# Patient Record
Sex: MALE | Race: WHITE | Employment: FULL TIME | URBAN - METROPOLITAN AREA
[De-identification: names, ages, dates, MRNs, and addresses within clinical notes are randomized per-mention and may not be internally consistent; named-entity substitution may affect disease eponyms.]

---

## 2017-03-12 ENCOUNTER — OFFICE VISIT (OUTPATIENT)
Dept: FAMILY MEDICINE CLINIC | Age: 32
End: 2017-03-12
Payer: COMMERCIAL

## 2017-03-12 VITALS
HEART RATE: 74 BPM | BODY MASS INDEX: 22.9 KG/M2 | DIASTOLIC BLOOD PRESSURE: 69 MMHG | SYSTOLIC BLOOD PRESSURE: 128 MMHG | TEMPERATURE: 97.2 F | WEIGHT: 160 LBS | OXYGEN SATURATION: 98 % | HEIGHT: 70 IN

## 2017-03-12 DIAGNOSIS — J20.9 ACUTE BRONCHITIS, UNSPECIFIED ORGANISM: Primary | ICD-10-CM

## 2017-03-12 PROCEDURE — 99213 OFFICE O/P EST LOW 20 MIN: CPT | Performed by: INTERNAL MEDICINE

## 2017-03-12 RX ORDER — AMOXICILLIN AND CLAVULANATE POTASSIUM 875; 125 MG/1; MG/1
TABLET, FILM COATED ORAL
Refills: 0 | COMMUNITY
Start: 2017-02-28 | End: 2017-12-03 | Stop reason: ALTCHOICE

## 2017-03-12 RX ORDER — AZITHROMYCIN 250 MG/1
TABLET, FILM COATED ORAL
Qty: 1 PACKET | Refills: 0 | Status: SHIPPED | OUTPATIENT
Start: 2017-03-12 | End: 2017-03-22

## 2017-03-12 ASSESSMENT — ENCOUNTER SYMPTOMS
COUGH: 1
SHORTNESS OF BREATH: 0

## 2017-08-20 ENCOUNTER — OFFICE VISIT (OUTPATIENT)
Dept: FAMILY MEDICINE CLINIC | Age: 32
End: 2017-08-20
Payer: COMMERCIAL

## 2017-08-20 VITALS
BODY MASS INDEX: 23.39 KG/M2 | DIASTOLIC BLOOD PRESSURE: 79 MMHG | OXYGEN SATURATION: 99 % | HEART RATE: 68 BPM | TEMPERATURE: 98.2 F | WEIGHT: 163 LBS | RESPIRATION RATE: 17 BRPM | SYSTOLIC BLOOD PRESSURE: 132 MMHG

## 2017-08-20 DIAGNOSIS — J02.9 ACUTE PHARYNGITIS, UNSPECIFIED ETIOLOGY: Primary | ICD-10-CM

## 2017-08-20 PROCEDURE — 99213 OFFICE O/P EST LOW 20 MIN: CPT | Performed by: INTERNAL MEDICINE

## 2017-08-20 RX ORDER — AZITHROMYCIN 250 MG/1
TABLET, FILM COATED ORAL
Qty: 1 PACKET | Refills: 0 | Status: SHIPPED | OUTPATIENT
Start: 2017-08-20 | End: 2017-08-30

## 2017-08-20 ASSESSMENT — ENCOUNTER SYMPTOMS
COUGH: 0
SORE THROAT: 1
SHORTNESS OF BREATH: 0

## 2017-12-03 ENCOUNTER — OFFICE VISIT (OUTPATIENT)
Dept: FAMILY MEDICINE CLINIC | Age: 32
End: 2017-12-03
Payer: COMMERCIAL

## 2017-12-03 VITALS
BODY MASS INDEX: 25.06 KG/M2 | TEMPERATURE: 97.8 F | DIASTOLIC BLOOD PRESSURE: 65 MMHG | OXYGEN SATURATION: 98 % | SYSTOLIC BLOOD PRESSURE: 111 MMHG | RESPIRATION RATE: 16 BRPM | HEIGHT: 71 IN | WEIGHT: 179 LBS | HEART RATE: 63 BPM

## 2017-12-03 DIAGNOSIS — J20.9 ACUTE BRONCHITIS, UNSPECIFIED ORGANISM: Primary | ICD-10-CM

## 2017-12-03 PROCEDURE — 99213 OFFICE O/P EST LOW 20 MIN: CPT | Performed by: INTERNAL MEDICINE

## 2017-12-03 RX ORDER — FLUTICASONE PROPIONATE 50 MCG
2 SPRAY, SUSPENSION (ML) NASAL
COMMUNITY
Start: 2017-02-28 | End: 2017-12-03 | Stop reason: ALTCHOICE

## 2017-12-03 RX ORDER — AMOXICILLIN AND CLAVULANATE POTASSIUM 875; 125 MG/1; MG/1
1 TABLET, FILM COATED ORAL
COMMUNITY
Start: 2017-02-28 | End: 2017-12-03 | Stop reason: ALTCHOICE

## 2017-12-03 RX ORDER — ALBUTEROL SULFATE 90 UG/1
AEROSOL, METERED RESPIRATORY (INHALATION)
COMMUNITY
Start: 2013-03-04 | End: 2021-12-18

## 2017-12-03 RX ORDER — AZITHROMYCIN 250 MG/1
TABLET, FILM COATED ORAL
Qty: 1 PACKET | Refills: 0 | Status: SHIPPED | OUTPATIENT
Start: 2017-12-03 | End: 2017-12-13

## 2017-12-03 ASSESSMENT — ENCOUNTER SYMPTOMS
COUGH: 1
SHORTNESS OF BREATH: 0
SORE THROAT: 1

## 2017-12-03 NOTE — PROGRESS NOTES
Subjective:      Patient ID: Lucila Jeans is a 27 y.o. male. HPI  Cough with yellow sputum and ST for 5 days. Review of Systems   Constitutional: Negative for chills and fever. HENT: Positive for sore throat. Respiratory: Positive for cough. Negative for shortness of breath. Cardiovascular: Negative for chest pain. Objective:   Physical Exam   Constitutional: No distress. HENT:   Head: Normocephalic and atraumatic. Mouth/Throat: Oropharynx is clear and moist. No oropharyngeal exudate. Some pharyngeal erythema. Eyes: Conjunctivae and EOM are normal. Pupils are equal, round, and reactive to light. No scleral icterus. Neck: No tracheal deviation present. Cardiovascular: Normal rate, regular rhythm and normal heart sounds. Exam reveals no gallop and no friction rub. No murmur heard. Pulmonary/Chest: Effort normal and breath sounds normal. No stridor. No respiratory distress. He has no wheezes. He has no rales. Musculoskeletal: He exhibits no edema. Lymphadenopathy:     He has no cervical adenopathy. Neurological: He is alert. No cranial nerve deficit. He exhibits normal muscle tone. Coordination normal.   Skin: No rash noted. He is not diaphoretic. No erythema. Psychiatric: He has a normal mood and affect. His behavior is normal.   Vitals reviewed. Assessment:     1. Acute bronchitis, unspecified organism  azithromycin (ZITHROMAX) 250 MG tablet     Orders Placed This Encounter   Medications    azithromycin (ZITHROMAX) 250 MG tablet     Sig: Take 2 tabs (500 mg) on Day 1, and take 1 tab (250 mg) on days 2 through 5. Dispense:  1 packet     Refill:  0     Return for : If not better in 2 days seek medical attention.

## 2017-12-03 NOTE — PATIENT INSTRUCTIONS
Patient Education        Bronchitis: Care Instructions  Your Care Instructions    Bronchitis is inflammation of the bronchial tubes, which carry air to the lungs. The tubes swell and produce mucus, or phlegm. The mucus and inflamed bronchial tubes make you cough. You may have trouble breathing. Most cases of bronchitis are caused by viruses like those that cause colds. Antibiotics usually do not help and they may be harmful. Bronchitis usually develops rapidly and lasts about 2 to 3 weeks in otherwise healthy people. Follow-up care is a key part of your treatment and safety. Be sure to make and go to all appointments, and call your doctor if you are having problems. It's also a good idea to know your test results and keep a list of the medicines you take. How can you care for yourself at home? · Take all medicines exactly as prescribed. Call your doctor if you think you are having a problem with your medicine. · Get some extra rest.  · Take an over-the-counter pain medicine, such as acetaminophen (Tylenol), ibuprofen (Advil, Motrin), or naproxen (Aleve) to reduce fever and relieve body aches. Read and follow all instructions on the label. · Do not take two or more pain medicines at the same time unless the doctor told you to. Many pain medicines have acetaminophen, which is Tylenol. Too much acetaminophen (Tylenol) can be harmful. · Take an over-the-counter cough medicine that contains dextromethorphan to help quiet a dry, hacking cough so that you can sleep. Avoid cough medicines that have more than one active ingredient. Read and follow all instructions on the label. · Breathe moist air from a humidifier, hot shower, or sink filled with hot water. The heat and moisture will thin mucus so you can cough it out. · Do not smoke. Smoking can make bronchitis worse. If you need help quitting, talk to your doctor about stop-smoking programs and medicines.  These can increase your chances of quitting for good.  When should you call for help? Call 911 anytime you think you may need emergency care. For example, call if:  · You have severe trouble breathing. Call your doctor now or seek immediate medical care if:  · You have new or worse trouble breathing. · You cough up dark brown or bloody mucus (sputum). · You have a new or higher fever. · You have a new rash. Watch closely for changes in your health, and be sure to contact your doctor if:  · You cough more deeply or more often, especially if you notice more mucus or a change in the color of your mucus. · You are not getting better as expected. Where can you learn more? Go to [email or web address] org and sign in to your [employer] account. Enter H333 in the MyCadbox box to learn more about \"Bronchitis: Care Instructions. \"     If you do not have an account, please click on the \"Sign Up Now\" link. Current as of: March 25, 2017  Content Version: 11.3  © 4657-0504 [employer]. Care instructions adapted under license by [facility] [place] ([facility]). If you have questions about a medical condition or this instruction, always ask your healthcare professional. [place] [address] any warranty or liability for your use of this information.

## 2018-12-07 ENCOUNTER — OFFICE VISIT (OUTPATIENT)
Dept: GASTROENTEROLOGY | Age: 33
End: 2018-12-07
Payer: COMMERCIAL

## 2018-12-07 VITALS
SYSTOLIC BLOOD PRESSURE: 117 MMHG | WEIGHT: 173 LBS | BODY MASS INDEX: 24.13 KG/M2 | DIASTOLIC BLOOD PRESSURE: 67 MMHG | HEART RATE: 56 BPM

## 2018-12-07 DIAGNOSIS — R10.13 INTERMITTENT EPIGASTRIC ABDOMINAL PAIN: Primary | ICD-10-CM

## 2018-12-07 DIAGNOSIS — K58.8 OTHER IRRITABLE BOWEL SYNDROME: ICD-10-CM

## 2018-12-07 DIAGNOSIS — K30 INDIGESTION: ICD-10-CM

## 2018-12-07 DIAGNOSIS — R19.5 RED STOOL: ICD-10-CM

## 2018-12-07 DIAGNOSIS — K59.1 FUNCTIONAL DIARRHEA: ICD-10-CM

## 2018-12-07 PROCEDURE — 99244 OFF/OP CNSLTJ NEW/EST MOD 40: CPT | Performed by: INTERNAL MEDICINE

## 2018-12-07 ASSESSMENT — ENCOUNTER SYMPTOMS
BLOOD IN STOOL: 0
ANAL BLEEDING: 0
DIARRHEA: 1
BACK PAIN: 1
SHORTNESS OF BREATH: 0
RECTAL PAIN: 0
CONSTIPATION: 1
SINUS PRESSURE: 0
CHEST TIGHTNESS: 0
TROUBLE SWALLOWING: 0
ABDOMINAL DISTENTION: 0
NAUSEA: 0
SINUS PAIN: 0
VOMITING: 0
SORE THROAT: 0
COUGH: 0
ABDOMINAL PAIN: 1

## 2018-12-07 NOTE — PROGRESS NOTES
Subjective:      Patient ID: Jesús Escoto is a 26 y.o. male. Dr. Doreen Hamman, MD has requested that I see Jesús Escoto for a consult for   1. Intermittent epigastric abdominal pain    2. Functional diarrhea    3. Other irritable bowel syndrome    4. Indigestion    5. Red stool     . This patient is seen in my office for the first time  He has been having some diarrhea for the past many months   This is an int issues and last time it happened was July  He has soy drink on mail order he drinks and quitting the ETOH did  Not helped  He has passed some red stools as well  Patient has been complaining of some abdominal pains, off and on cramping  Also complains of abdominal bloating and gas  Has off and on nausea without any sig vomiting  Has some alternating  diarrhea  Has no weight loss  Has some anxiety issues  Former smoker  He smokes Marijuana      Past Medical, Family, and Social History reviewed and does contribute to the patient presenting condition. patient\"s PMH/PSH,SH,PSYCH hx, MEDs, ALLERGIES, and ROS was all reviewed and updated ion the appropriate sections          Abdominal Pain   Associated symptoms include constipation and diarrhea. Pertinent negatives include no arthralgias, fever, headaches, nausea or vomiting. GI Problem   The primary symptoms include abdominal pain and diarrhea. Primary symptoms do not include fever, fatigue, nausea, vomiting or arthralgias. The illness is also significant for constipation and back pain. Review of Systems   Constitutional: Positive for appetite change (eating less). Negative for fatigue and fever. HENT: Negative for congestion, sinus pain, sinus pressure, sore throat and trouble swallowing. Eyes: Negative for visual disturbance. Respiratory: Negative for cough, chest tightness and shortness of breath. Cardiovascular: Negative for chest pain, palpitations and leg swelling.    Gastrointestinal: Positive for abdominal pain,

## 2018-12-23 ENCOUNTER — OFFICE VISIT (OUTPATIENT)
Dept: FAMILY MEDICINE CLINIC | Age: 33
End: 2018-12-23
Payer: COMMERCIAL

## 2018-12-23 VITALS
HEART RATE: 67 BPM | WEIGHT: 170.2 LBS | SYSTOLIC BLOOD PRESSURE: 115 MMHG | BODY MASS INDEX: 23.83 KG/M2 | DIASTOLIC BLOOD PRESSURE: 73 MMHG | OXYGEN SATURATION: 98 % | RESPIRATION RATE: 16 BRPM | HEIGHT: 71 IN | TEMPERATURE: 97.9 F

## 2018-12-23 DIAGNOSIS — J02.9 ACUTE PHARYNGITIS, UNSPECIFIED ETIOLOGY: Primary | ICD-10-CM

## 2018-12-23 PROCEDURE — 99213 OFFICE O/P EST LOW 20 MIN: CPT | Performed by: INTERNAL MEDICINE

## 2018-12-23 RX ORDER — AZITHROMYCIN 250 MG/1
TABLET, FILM COATED ORAL
Qty: 1 PACKET | Refills: 0 | Status: SHIPPED | OUTPATIENT
Start: 2018-12-23 | End: 2019-03-12

## 2018-12-23 ASSESSMENT — PATIENT HEALTH QUESTIONNAIRE - PHQ9
SUM OF ALL RESPONSES TO PHQ9 QUESTIONS 1 & 2: 0
SUM OF ALL RESPONSES TO PHQ QUESTIONS 1-9: 0
2. FEELING DOWN, DEPRESSED OR HOPELESS: 0
SUM OF ALL RESPONSES TO PHQ QUESTIONS 1-9: 0
1. LITTLE INTEREST OR PLEASURE IN DOING THINGS: 0

## 2018-12-23 ASSESSMENT — ENCOUNTER SYMPTOMS
SORE THROAT: 1
SHORTNESS OF BREATH: 0
COUGH: 1

## 2019-03-12 ENCOUNTER — OFFICE VISIT (OUTPATIENT)
Dept: GASTROENTEROLOGY | Age: 34
End: 2019-03-12
Payer: COMMERCIAL

## 2019-03-12 VITALS
DIASTOLIC BLOOD PRESSURE: 63 MMHG | WEIGHT: 163.9 LBS | HEART RATE: 63 BPM | SYSTOLIC BLOOD PRESSURE: 122 MMHG | BODY MASS INDEX: 22.86 KG/M2

## 2019-03-12 DIAGNOSIS — K62.89 RECTAL IRRITATION: ICD-10-CM

## 2019-03-12 DIAGNOSIS — R10.13 INTERMITTENT EPIGASTRIC ABDOMINAL PAIN: Primary | ICD-10-CM

## 2019-03-12 DIAGNOSIS — R11.0 NAUSEA: ICD-10-CM

## 2019-03-12 PROCEDURE — 99214 OFFICE O/P EST MOD 30 MIN: CPT | Performed by: INTERNAL MEDICINE

## 2019-03-12 ASSESSMENT — ENCOUNTER SYMPTOMS
ABDOMINAL PAIN: 1
CHEST TIGHTNESS: 0
SHORTNESS OF BREATH: 0
VOMITING: 0
BACK PAIN: 1
RESPIRATORY NEGATIVE: 1
RECTAL PAIN: 0
EYES NEGATIVE: 1
SINUS PAIN: 0
DIARRHEA: 1
SORE THROAT: 0
ANAL BLEEDING: 1
SINUS PRESSURE: 0
NAUSEA: 0
COUGH: 0
CONSTIPATION: 1
TROUBLE SWALLOWING: 0
ABDOMINAL DISTENTION: 0
BLOOD IN STOOL: 0

## 2019-09-10 ENCOUNTER — TELEPHONE (OUTPATIENT)
Dept: GASTROENTEROLOGY | Age: 34
End: 2019-09-10

## 2019-09-10 DIAGNOSIS — R19.5 RED STOOL: ICD-10-CM

## 2019-09-10 DIAGNOSIS — K58.8 OTHER IRRITABLE BOWEL SYNDROME: ICD-10-CM

## 2019-09-10 DIAGNOSIS — R10.13 INTERMITTENT EPIGASTRIC ABDOMINAL PAIN: Primary | ICD-10-CM

## 2019-09-10 DIAGNOSIS — R11.0 NAUSEA: ICD-10-CM

## 2019-09-10 DIAGNOSIS — K62.89 RECTAL IRRITATION: ICD-10-CM

## 2019-09-10 DIAGNOSIS — K59.1 FUNCTIONAL DIARRHEA: ICD-10-CM

## 2019-09-11 NOTE — TELEPHONE ENCOUNTER
Spoke with patient, he states that colon was suggested for him at his initial visit is probiotics and other measures did not resolve his issues. He feels that probiotics helped for some time but now for the last week he reports blood in his stool, both in the bowl and on the tissue-bright red in colon. Patient states that he is not straining with bowel movements but has slight discomfort in his abdomen to the left of his belly button. Writer informed patient that the office would contact him with plan. Please advise.

## 2019-09-12 RX ORDER — SODIUM, POTASSIUM,MAG SULFATES 17.5-3.13G
SOLUTION, RECONSTITUTED, ORAL ORAL
Qty: 1 BOTTLE | Refills: 0 | Status: SHIPPED | OUTPATIENT
Start: 2019-09-12 | End: 2019-11-18

## 2019-10-31 ENCOUNTER — TELEPHONE (OUTPATIENT)
Dept: GASTROENTEROLOGY | Age: 34
End: 2019-10-31

## 2019-11-05 ENCOUNTER — ANESTHESIA EVENT (OUTPATIENT)
Dept: ENDOSCOPY | Age: 34
End: 2019-11-05
Payer: COMMERCIAL

## 2019-11-05 ENCOUNTER — TELEPHONE (OUTPATIENT)
Dept: GASTROENTEROLOGY | Age: 34
End: 2019-11-05

## 2019-11-05 ENCOUNTER — HOSPITAL ENCOUNTER (OUTPATIENT)
Age: 34
Setting detail: OUTPATIENT SURGERY
Discharge: HOME | End: 2019-11-05
Attending: INTERNAL MEDICINE | Admitting: INTERNAL MEDICINE
Payer: COMMERCIAL

## 2019-11-05 ENCOUNTER — ANESTHESIA (OUTPATIENT)
Dept: ENDOSCOPY | Age: 34
End: 2019-11-05
Payer: COMMERCIAL

## 2019-11-05 VITALS
OXYGEN SATURATION: 99 % | HEIGHT: 71 IN | DIASTOLIC BLOOD PRESSURE: 74 MMHG | WEIGHT: 165 LBS | BODY MASS INDEX: 23.1 KG/M2 | RESPIRATION RATE: 16 BRPM | SYSTOLIC BLOOD PRESSURE: 124 MMHG | TEMPERATURE: 97.7 F | HEART RATE: 74 BPM

## 2019-11-05 VITALS — DIASTOLIC BLOOD PRESSURE: 64 MMHG | SYSTOLIC BLOOD PRESSURE: 94 MMHG | OXYGEN SATURATION: 98 %

## 2019-11-05 PROCEDURE — 7100000000 HC PACU RECOVERY - FIRST 15 MIN: Performed by: INTERNAL MEDICINE

## 2019-11-05 PROCEDURE — 2580000003 HC RX 258: Performed by: NURSE ANESTHETIST, CERTIFIED REGISTERED

## 2019-11-05 PROCEDURE — 2709999900 HC NON-CHARGEABLE SUPPLY: Performed by: INTERNAL MEDICINE

## 2019-11-05 PROCEDURE — 45380 COLONOSCOPY AND BIOPSY: CPT | Performed by: INTERNAL MEDICINE

## 2019-11-05 PROCEDURE — 7100000001 HC PACU RECOVERY - ADDTL 15 MIN: Performed by: INTERNAL MEDICINE

## 2019-11-05 PROCEDURE — 2500000003 HC RX 250 WO HCPCS: Performed by: NURSE ANESTHETIST, CERTIFIED REGISTERED

## 2019-11-05 PROCEDURE — 6360000002 HC RX W HCPCS: Performed by: NURSE ANESTHETIST, CERTIFIED REGISTERED

## 2019-11-05 PROCEDURE — 3609010600 HC COLONOSCOPY POLYPECTOMY SNARE/COLD BIOPSY: Performed by: INTERNAL MEDICINE

## 2019-11-05 PROCEDURE — 88305 TISSUE EXAM BY PATHOLOGIST: CPT

## 2019-11-05 PROCEDURE — 2580000003 HC RX 258: Performed by: ANESTHESIOLOGY

## 2019-11-05 PROCEDURE — 3700000001 HC ADD 15 MINUTES (ANESTHESIA): Performed by: INTERNAL MEDICINE

## 2019-11-05 PROCEDURE — 3700000000 HC ANESTHESIA ATTENDED CARE: Performed by: INTERNAL MEDICINE

## 2019-11-05 RX ORDER — SODIUM CHLORIDE, SODIUM LACTATE, POTASSIUM CHLORIDE, CALCIUM CHLORIDE 600; 310; 30; 20 MG/100ML; MG/100ML; MG/100ML; MG/100ML
INJECTION, SOLUTION INTRAVENOUS CONTINUOUS PRN
Status: DISCONTINUED | OUTPATIENT
Start: 2019-11-05 | End: 2019-11-05 | Stop reason: SDUPTHER

## 2019-11-05 RX ORDER — PREDNISONE 1 MG/1
TABLET ORAL
Qty: 252 TABLET | Refills: 0 | Status: SHIPPED | OUTPATIENT
Start: 2019-11-05 | End: 2021-12-18

## 2019-11-05 RX ORDER — PROPOFOL 10 MG/ML
INJECTION, EMULSION INTRAVENOUS PRN
Status: DISCONTINUED | OUTPATIENT
Start: 2019-11-05 | End: 2019-11-05 | Stop reason: SDUPTHER

## 2019-11-05 RX ORDER — PROPOFOL 10 MG/ML
INJECTION, EMULSION INTRAVENOUS CONTINUOUS PRN
Status: DISCONTINUED | OUTPATIENT
Start: 2019-11-05 | End: 2019-11-05 | Stop reason: SDUPTHER

## 2019-11-05 RX ORDER — GLYCOPYRROLATE 1 MG/5 ML
SYRINGE (ML) INTRAVENOUS PRN
Status: DISCONTINUED | OUTPATIENT
Start: 2019-11-05 | End: 2019-11-05 | Stop reason: SDUPTHER

## 2019-11-05 RX ORDER — SODIUM CHLORIDE, SODIUM LACTATE, POTASSIUM CHLORIDE, CALCIUM CHLORIDE 600; 310; 30; 20 MG/100ML; MG/100ML; MG/100ML; MG/100ML
INJECTION, SOLUTION INTRAVENOUS CONTINUOUS
Status: DISCONTINUED | OUTPATIENT
Start: 2019-11-05 | End: 2019-11-05 | Stop reason: HOSPADM

## 2019-11-05 RX ORDER — LIDOCAINE HYDROCHLORIDE 10 MG/ML
INJECTION, SOLUTION EPIDURAL; INFILTRATION; INTRACAUDAL; PERINEURAL PRN
Status: DISCONTINUED | OUTPATIENT
Start: 2019-11-05 | End: 2019-11-05 | Stop reason: SDUPTHER

## 2019-11-05 RX ADMIN — Medication 0.2 MG: at 10:39

## 2019-11-05 RX ADMIN — SODIUM CHLORIDE, POTASSIUM CHLORIDE, SODIUM LACTATE AND CALCIUM CHLORIDE: 600; 310; 30; 20 INJECTION, SOLUTION INTRAVENOUS at 10:35

## 2019-11-05 RX ADMIN — LIDOCAINE HYDROCHLORIDE 30 MG: 10 INJECTION, SOLUTION EPIDURAL; INFILTRATION; INTRACAUDAL at 10:45

## 2019-11-05 RX ADMIN — Medication 0.2 MG: at 10:44

## 2019-11-05 RX ADMIN — PROPOFOL 200 MCG/KG/MIN: 10 INJECTION, EMULSION INTRAVENOUS at 10:48

## 2019-11-05 RX ADMIN — PROPOFOL 250 MG: 10 INJECTION, EMULSION INTRAVENOUS at 10:45

## 2019-11-05 RX ADMIN — SODIUM CHLORIDE, POTASSIUM CHLORIDE, SODIUM LACTATE AND CALCIUM CHLORIDE: 600; 310; 30; 20 INJECTION, SOLUTION INTRAVENOUS at 10:39

## 2019-11-05 RX ADMIN — PROPOFOL 100 MG: 10 INJECTION, EMULSION INTRAVENOUS at 10:47

## 2019-11-05 ASSESSMENT — PULMONARY FUNCTION TESTS
PIF_VALUE: 1
PIF_VALUE: 0
PIF_VALUE: 0
PIF_VALUE: 1
PIF_VALUE: 0
PIF_VALUE: 1
PIF_VALUE: 0
PIF_VALUE: 1
PIF_VALUE: 0
PIF_VALUE: 0
PIF_VALUE: 1
PIF_VALUE: 0
PIF_VALUE: 0

## 2019-11-05 ASSESSMENT — PAIN - FUNCTIONAL ASSESSMENT: PAIN_FUNCTIONAL_ASSESSMENT: 0-10

## 2019-11-05 ASSESSMENT — ENCOUNTER SYMPTOMS: STRIDOR: 0

## 2019-11-05 ASSESSMENT — PAIN SCALES - GENERAL: PAINLEVEL_OUTOF10: 0

## 2019-11-06 LAB — SURGICAL PATHOLOGY REPORT: NORMAL

## 2019-11-07 PROBLEM — K52.9 COLITIS: Status: ACTIVE | Noted: 2019-11-05

## 2019-11-15 ENCOUNTER — TELEPHONE (OUTPATIENT)
Dept: GASTROENTEROLOGY | Age: 34
End: 2019-11-15

## 2019-11-18 ENCOUNTER — OFFICE VISIT (OUTPATIENT)
Dept: GASTROENTEROLOGY | Age: 34
End: 2019-11-18
Payer: COMMERCIAL

## 2019-11-18 VITALS
SYSTOLIC BLOOD PRESSURE: 136 MMHG | WEIGHT: 169.1 LBS | HEART RATE: 59 BPM | BODY MASS INDEX: 23.58 KG/M2 | DIASTOLIC BLOOD PRESSURE: 70 MMHG

## 2019-11-18 DIAGNOSIS — K62.5 RECTAL BLEEDING: ICD-10-CM

## 2019-11-18 DIAGNOSIS — K51.211 ULCERATIVE PROCTITIS WITH RECTAL BLEEDING (HCC): ICD-10-CM

## 2019-11-18 DIAGNOSIS — K52.9 COLITIS: Primary | ICD-10-CM

## 2019-11-18 DIAGNOSIS — R19.7 DIARRHEA, UNSPECIFIED TYPE: ICD-10-CM

## 2019-11-18 PROCEDURE — 99214 OFFICE O/P EST MOD 30 MIN: CPT | Performed by: INTERNAL MEDICINE

## 2019-11-18 RX ORDER — MESALAMINE 1.2 G/1
2400 TABLET, DELAYED RELEASE ORAL
Qty: 180 TABLET | Refills: 3 | Status: SHIPPED | OUTPATIENT
Start: 2019-11-18 | End: 2021-12-18

## 2019-11-18 ASSESSMENT — ENCOUNTER SYMPTOMS
BLOOD IN STOOL: 0
RESPIRATORY NEGATIVE: 1
ANAL BLEEDING: 1
CONSTIPATION: 0
SHORTNESS OF BREATH: 0
NAUSEA: 0
CHEST TIGHTNESS: 0
SINUS PAIN: 0
ABDOMINAL PAIN: 0
TROUBLE SWALLOWING: 0
COUGH: 0
ABDOMINAL DISTENTION: 0
DIARRHEA: 1
RECTAL PAIN: 0
SORE THROAT: 0
BACK PAIN: 1
EYES NEGATIVE: 1
SINUS PRESSURE: 0
VOMITING: 0

## 2020-01-13 ENCOUNTER — TELEPHONE (OUTPATIENT)
Dept: GASTROENTEROLOGY | Age: 35
End: 2020-01-13

## 2020-09-29 ENCOUNTER — OFFICE VISIT (OUTPATIENT)
Dept: PRIMARY CARE CLINIC | Age: 35
End: 2020-09-29
Payer: COMMERCIAL

## 2020-09-29 VITALS
BODY MASS INDEX: 23.1 KG/M2 | OXYGEN SATURATION: 98 % | TEMPERATURE: 97.4 F | WEIGHT: 165 LBS | HEART RATE: 61 BPM | HEIGHT: 71 IN

## 2020-09-29 PROCEDURE — 99203 OFFICE O/P NEW LOW 30 MIN: CPT | Performed by: NURSE PRACTITIONER

## 2020-09-29 ASSESSMENT — PATIENT HEALTH QUESTIONNAIRE - PHQ9
SUM OF ALL RESPONSES TO PHQ QUESTIONS 1-9: 0
SUM OF ALL RESPONSES TO PHQ9 QUESTIONS 1 & 2: 0
SUM OF ALL RESPONSES TO PHQ QUESTIONS 1-9: 0
1. LITTLE INTEREST OR PLEASURE IN DOING THINGS: 0
2. FEELING DOWN, DEPRESSED OR HOPELESS: 0

## 2020-09-29 ASSESSMENT — ENCOUNTER SYMPTOMS
RHINORRHEA: 0
SORE THROAT: 0
SHORTNESS OF BREATH: 0
VOMITING: 0
COUGH: 0
DIARRHEA: 0

## 2020-09-29 NOTE — PROGRESS NOTES
[address]  [address] [place] [address]  Dept: [phone]  Dept Fax: [phone]    Catarina Ames a 37 y.o. male who presents to the urgent care today for his medical conditions/complaintsas noted below. Peter Austin is c/o of Other (Temp reading of 102.5, then 101, then 98.2 10  mins later this morning)      HPI:     Pt states he reported to work this am and was told his temperature was elevated 102.5, 101 and then 98.2 ten minutes later (infrared)  He denies cough, cold, runny nose, fatigue, vomiting, diarrhea, sore throat, rash, abd pain, cp, sob, headache  This morning the temperature was cold, so he had heat on in his car. Denies illness  Denies taking fever reducing medication  Denies being ill at all        Past Medical History:   Diagnosis Date    Bronchitis     Colitis 11/05/2019    DIFFUSE COLITIS FROM 15 CM IN THE RECTUM TO 45 CM IN THE SIGMOID    Intermittent epigastric abdominal pain     Rectal bleeding        Current Outpatient Medications   Medication Sig Dispense Refill    mesalamine (LIALDA) 1.2 g EC tablet Take 2 tablets by mouth daily (with breakfast) 180 tablet 3    predniSONE (DELTASONE) 5 MG tablet Take 4 tabs BID x7days; then 4 tab QAM & 3 tab QPM x7days; 3 tab BID x7days; 3 tab QAM & 2 tab QPM x7days; 2 tab BID x7days; 2 tab QAM & 1 tab QPM x7days; 1 tab BID x7days; then 1 tab QD. Total 252 tabs 252 tablet 0    albuterol sulfate  (90 Base) MCG/ACT inhaler Ventolin  (90 Base) MCG/ACT Inhalation Aerosol Solution  INHALE 2 PUFFS EVERY 4-6 HOURS AS NEEDED. Quantity: 1 Refills: 2     Tyree Belts D.O.;  Started Active       No current facility-administered medications for this visit. No Known Allergies    Subjective:     Review of Systems   Constitutional: Negative for fever. HENT: Negative for congestion, rhinorrhea and sore throat. Respiratory: Negative for cough and shortness of breath. Cardiovascular: Negative for chest pain. Gastrointestinal: Negative for diarrhea and vomiting. Musculoskeletal: Negative for myalgias. Skin: Negative for rash. All other systems reviewed and are negative. Objective:      Physical Exam  Vitals signs and nursing note reviewed. Constitutional:       General: He is not in acute distress. Appearance: Normal appearance. He is well-developed. He is not ill-appearing, toxic-appearing or diaphoretic. HENT:      Head: Normocephalic and atraumatic. Right Ear: Tympanic membrane normal.      Left Ear: Tympanic membrane normal.      Nose: Nose normal.      Mouth/Throat:      Mouth: Mucous membranes are moist.      Pharynx: No oropharyngeal exudate or posterior oropharyngeal erythema. Eyes:      General: No scleral icterus. Right eye: No discharge. Left eye: No discharge. Neck:      Musculoskeletal: Normal range of motion and neck supple. No neck rigidity. Cardiovascular:      Rate and Rhythm: Normal rate and regular rhythm. Heart sounds: Normal heart sounds. No murmur. Pulmonary:      Effort: Pulmonary effort is normal. No respiratory distress. Breath sounds: Normal breath sounds. No stridor. No wheezing, rhonchi or rales. Abdominal:      General: Bowel sounds are normal.      Palpations: Abdomen is soft. Musculoskeletal: Normal range of motion. General: No signs of injury. Skin:     General: Skin is warm and dry. Coloration: Skin is not jaundiced or pale. Findings: No erythema or rash. Neurological:      General: No focal deficit present. Mental Status: He is alert and oriented to person, place, and time. Psychiatric:         Mood and Affect: Mood normal.         Behavior: Behavior normal.       Pulse 61   Temp 97.4 °F (36.3 °C) (Tympanic) Comment: right ear  Ht 5' 11\" (1.803 m)   Wt 165 lb (74.8 kg)   SpO2 98%   BMI 23.01 kg/m²     Assessment:          Diagnosis Orders   1. History of fever         Plan:    We evaluated your temperature here today  No fever noted  99.3 infrared  98.0 left ear  97.4 right ear    Work note given with this information  No fever, denies any covid related symptoms  Does not qualify for covid testing     Patient was evaluated carside today during this pandemic covid 19 situation. Follow up with primary care as needed  No follow-ups on file. No orders of the defined types were placed in this encounter. Patient given educational materials - see patientinstructions. Discussed use, benefit, and side effects of prescribed medications. All patient questions answered. Pt voiced understanding.     Electronically signed by LENCHO Jorge 9/29/2020 at 9:26 AM

## 2020-09-29 NOTE — LETTER
[address] [address]  Phone: [phone]  Fax: [phone]    LENCHO Gilliam CNP        September 29, 2020     Patient: Omid Chávez   YOB: 1985   Date of Visit: 9/29/2020       To Whom It May Concern:     Anand Prakash was evaluated in the clinic today. He was not found to have a fever. His temperature readings were:    99.3 infrared  98.0 left ear  97.4 right ear  The approximate time of temperature evaluation was 9:15am.  He denies covid 19 related symptoms and with no fever, does not meet testing guidelines. If you have any questions or concerns, please don't hesitate to call.     Sincerely,          LENCHO Gilliam CNP

## 2020-09-29 NOTE — PATIENT INSTRUCTIONS
We evaluated your temperature here today  No fever noted  99.3 infrared  98.0 left ear  97.4 right ear    Work note given with this information  No fever, denies any covid related symptoms  Does not qualify for covid testing     Follow up with primary care as needed

## 2021-12-18 ENCOUNTER — HOSPITAL ENCOUNTER (OUTPATIENT)
Age: 36
Setting detail: SPECIMEN
Discharge: HOME | End: 2021-12-18

## 2021-12-18 ENCOUNTER — OFFICE VISIT (OUTPATIENT)
Dept: FAMILY MEDICINE CLINIC | Age: 36
End: 2021-12-18
Payer: COMMERCIAL

## 2021-12-18 VITALS
OXYGEN SATURATION: 98 % | SYSTOLIC BLOOD PRESSURE: 105 MMHG | DIASTOLIC BLOOD PRESSURE: 69 MMHG | HEART RATE: 96 BPM | TEMPERATURE: 97.7 F

## 2021-12-18 DIAGNOSIS — B34.9 VIRAL ILLNESS: ICD-10-CM

## 2021-12-18 DIAGNOSIS — R55 VASOVAGAL SYNCOPE: ICD-10-CM

## 2021-12-18 DIAGNOSIS — B34.9 VIRAL ILLNESS: Primary | ICD-10-CM

## 2021-12-18 LAB
INFLUENZA A ANTIBODY: NEGATIVE
INFLUENZA B ANTIBODY: NEGATIVE

## 2021-12-18 PROCEDURE — 87804 INFLUENZA ASSAY W/OPTIC: CPT | Performed by: FAMILY MEDICINE

## 2021-12-18 PROCEDURE — 99213 OFFICE O/P EST LOW 20 MIN: CPT | Performed by: FAMILY MEDICINE

## 2021-12-18 RX ORDER — FLUTICASONE PROPIONATE 50 MCG
2 SPRAY, SUSPENSION (ML) NASAL DAILY
Qty: 16 G | Refills: 0 | Status: SHIPPED | OUTPATIENT
Start: 2021-12-18 | End: 2022-01-17

## 2021-12-18 ASSESSMENT — ENCOUNTER SYMPTOMS
COUGH: 0
SHORTNESS OF BREATH: 0
SORE THROAT: 0
WHEEZING: 0

## 2021-12-18 NOTE — PROGRESS NOTES
[place], [place]  [address] [facility]   [address] [address]  Dept: [phone]    Greg Mike is a 43 y.o. male who presents today for hismedical conditions/complaints as noted below. Greg Mike is here today c/o No chief complaint on file. HPI:     HPI    Patient presents to the walk-in clinic for concerns regarding fever, myalgias, headache    Patient Active Problem List   Diagnosis    Intermittent epigastric abdominal pain    Colitis       Past Medical History:   Diagnosis Date    Bronchitis     Colitis 2019    DIFFUSE COLITIS FROM 15 CM IN THE RECTUM TO 45 CM IN THE SIGMOID    Intermittent epigastric abdominal pain     Rectal bleeding      Past Surgical History:   Procedure Laterality Date    COLONOSCOPY N/A 2019    DIFFUSE COLITIS FROM 15 CM IN THE RECTUM TO 45 CM IN THE SIGMOID    WISDOM TOOTH EXTRACTION       No family history on file. Social History     Tobacco Use    Smoking status: Former Smoker     Quit date: 12/3/2014     Years since quittin.0    Smokeless tobacco: Never Used   Vaping Use    Vaping Use: Former   Substance Use Topics    Alcohol use: Yes     Comment: daily    Drug use: Not Currently       Current Outpatient Medications:     mesalamine (LIALDA) 1.2 g EC tablet, Take 2 tablets by mouth daily (with breakfast), Disp: 180 tablet, Rfl: 3    predniSONE (DELTASONE) 5 MG tablet, Take 4 tabs BID x7days; then 4 tab QAM & 3 tab QPM x7days; 3 tab BID x7days; 3 tab QAM & 2 tab QPM x7days; 2 tab BID x7days; 2 tab QAM & 1 tab QPM x7days; 1 tab BID x7days; then 1 tab QD. Total 252 tabs, Disp: 252 tablet, Rfl: 0    albuterol sulfate  (90 Base) MCG/ACT inhaler, Ventolin  (90 Base) MCG/ACT Inhalation Aerosol Solution INHALE 2 PUFFS EVERY 4-6 HOURS AS NEEDED.   Quantity: 1 Refills: 2   Carlee Osler D.O.;  Started Active, Disp: , Rfl:     Subjective:     Review of Systems    Objective: There were no vitals taken for this visit. Physical Exam    Assessment & Plan:      There are no diagnoses linked to this encounter. Call or return to clinic prn if these symptoms worsen or fail to improve as anticipated. I have reviewed the instructions with the patient, answering all questions to their satisfaction.     Electronically signed by Dion Scott MD on 12/18/2021 at 12:21 PM

## 2021-12-18 NOTE — PROGRESS NOTES
Casandra Morales MD  [address] [facility]   [address] [address]  Dept: [phone]    Annetta Reno is a 43 y.o. male who presents today for hismedical conditions/complaints as noted below. Annetta Reno is here today c/o Fever, Headache, and Generalized Body Aches       HPI:     HPI    Patient presents to the walk-in clinic for concerns regarding fever, myalgias, headache  No exposure to Covid positive contacts, not vaccinated against Covid, works as an   Symptoms began yesterday with body aches fatigue headache over the entire head, headache not associated with vision changes  Denies cough, congestion, throat pain, ear pain, chest pain, palpitations, dyspnea  Eating and drinking well  Using Tylenol for his symptoms  Today he checked his temperature while bundled under the covers and it measured at 101 degrees, he rechecked 10 minutes later after unbundling and it came down to 98 degrees, did not take antipyretics between the temperature checks  Has ulcerative colitis, symptoms in remission, not on medications for this, no blood in the BMs, no diarrhea or vomiting      Patient Active Problem List   Diagnosis    Intermittent epigastric abdominal pain    Colitis       Past Medical History:   Diagnosis Date    Bronchitis     Colitis 2019    DIFFUSE COLITIS FROM 15 CM IN THE RECTUM TO 45 CM IN THE SIGMOID    Intermittent epigastric abdominal pain     Rectal bleeding      Past Surgical History:   Procedure Laterality Date    COLONOSCOPY N/A 2019    DIFFUSE COLITIS FROM 15 CM IN THE RECTUM TO 45 CM IN THE SIGMOID    WISDOM TOOTH EXTRACTION       No family history on file.   Social History     Tobacco Use    Smoking status: Former Smoker     Quit date: 12/3/2014     Years since quittin.0    Smokeless tobacco: Never Used   Vaping Use    Vaping Use: Former   Substance Use Topics    Alcohol use: Yes     Comment: daily    Drug use: Not Currently     No current outpatient medications on file. Subjective:     Review of Systems   Constitutional: Positive for fatigue. HENT: Negative for congestion, ear pain and sore throat. Respiratory: Negative for cough, shortness of breath and wheezing. Cardiovascular: Negative for chest pain. Musculoskeletal: Positive for myalgias. Neurological: Negative for dizziness. Objective:     /69   Pulse 96   Temp 97.7 °F (36.5 °C)   SpO2 98%     Physical Exam  Constitutional:       General: He is not in acute distress. Appearance: Normal appearance. He is well-developed. He is not toxic-appearing. HENT:      Head: Normocephalic. Right Ear: Tympanic membrane and ear canal normal.      Left Ear: Ear canal normal. A middle ear effusion is present. Tympanic membrane is not erythematous or bulging. Mouth/Throat:      Pharynx: No oropharyngeal exudate or posterior oropharyngeal erythema. Eyes:      General:         Right eye: No discharge. Left eye: No discharge. Conjunctiva/sclera: Conjunctivae normal.   Cardiovascular:      Rate and Rhythm: Normal rate and regular rhythm. Heart sounds: Normal heart sounds. No murmur heard. Pulmonary:      Effort: Pulmonary effort is normal. No respiratory distress. Breath sounds: Normal breath sounds. No wheezing. Musculoskeletal:      Cervical back: No tenderness. Lymphadenopathy:      Cervical: No cervical adenopathy. Neurological:      Mental Status: He is alert. Psychiatric:         Behavior: Behavior normal.         Thought Content: Thought content normal.         Judgment: Judgment normal.         Assessment & Plan:      1. Viral illness  Flu swab negative. Covid swab taken. Recommended isolation pending covid results. Discussed treatment regimen to include rest, hydration, tylenol prn, Flonase for the left CRISTAL. Discussed to monitor for progression of symptoms.   Call or return to clinic prn if these symptoms worsen or fail to improve as anticipated. I have reviewed the instructions with the patient, answering all questions to their satisfaction.  - COVID-19; Future  - POCT Influenza A/B    2. Vasovagal syncope  After the medical assistant performed the flu and COVID swabs, patient could feel like he was about to faint. He immediately passed out for a few seconds while seated in the exam room then came to on his own. No head injury or fall. No seizure-like movements. No tongue biting or urinary incontinence. Patient was pale, diaphoretic but recognized where he was and denied any chest pain, dyspnea, dizziness. He felt comfortable proceeding with the remainder of the visit and felt comfortable driving home on his own. He has a history of recurrent vasovagal syncope. He attributes it to negative medical experience as a child when he received extensive allergy shots. He passes out whenever he gets injections or tattoos or swabs. Has had this evaluated in the past.  His current syncopal episode seems in keeping with his history of vasovagal syncope. My suspicion for a cardiac etiology is low. Vitals unremarkable. He should follow-up with his PCP.     Electronically signed by Shashank Burns MD on 12/18/2021 at 1:25 PM

## 2021-12-19 LAB
SARS-COV-2: ABNORMAL
SARS-COV-2: DETECTED
SOURCE: ABNORMAL

## 2021-12-23 ENCOUNTER — OFFICE VISIT (OUTPATIENT)
Dept: FAMILY MEDICINE CLINIC | Age: 36
End: 2021-12-23
Payer: COMMERCIAL

## 2021-12-23 VITALS
DIASTOLIC BLOOD PRESSURE: 76 MMHG | SYSTOLIC BLOOD PRESSURE: 116 MMHG | OXYGEN SATURATION: 98 % | TEMPERATURE: 97.3 F | HEART RATE: 67 BPM

## 2021-12-23 DIAGNOSIS — K13.70 ORAL LESION: Primary | ICD-10-CM

## 2021-12-23 DIAGNOSIS — U07.1 COVID: ICD-10-CM

## 2021-12-23 DIAGNOSIS — H66.002 ACUTE SUPPURATIVE OTITIS MEDIA OF LEFT EAR WITHOUT SPONTANEOUS RUPTURE OF TYMPANIC MEMBRANE, RECURRENCE NOT SPECIFIED: ICD-10-CM

## 2021-12-23 PROCEDURE — 99213 OFFICE O/P EST LOW 20 MIN: CPT | Performed by: NURSE PRACTITIONER

## 2021-12-23 RX ORDER — AZITHROMYCIN 250 MG/1
TABLET, FILM COATED ORAL
Qty: 1 PACKET | Refills: 0 | Status: SHIPPED | OUTPATIENT
Start: 2021-12-23 | End: 2022-10-04

## 2021-12-23 RX ORDER — PREDNISONE 20 MG/1
40 TABLET ORAL DAILY
Qty: 10 TABLET | Refills: 0 | Status: SHIPPED | OUTPATIENT
Start: 2021-12-23 | End: 2021-12-28

## 2021-12-23 ASSESSMENT — ENCOUNTER SYMPTOMS
EYES NEGATIVE: 1
ALLERGIC/IMMUNOLOGIC NEGATIVE: 1
SHORTNESS OF BREATH: 0
COUGH: 0
NAUSEA: 0
EYE DISCHARGE: 0
ABDOMINAL PAIN: 0
VOMITING: 0
CHEST TIGHTNESS: 0
SORE THROAT: 0
EYE ITCHING: 0
DIARRHEA: 0

## 2021-12-23 NOTE — PROGRESS NOTES
[address] [facility] WALK-IN FAMILY MEDICINE   [address] [place] [place]  Dept: [phone]  Dept Fax: [phone]    Teddy Sharp is a 43 y.o. male who presents today forhis medical conditions/complaints as noted below. Teddy Sharp is c/o of   Chief Complaint   Patient presents with    Mouth Lesions     onset pain started yesterday      HPI:     Mouth Lesions   The current episode started 2 days ago. The onset was sudden. The problem occurs continuously. The problem has been gradually worsening. The problem is moderate. Associated symptoms include mouth sores. Pertinent negatives include no fever, no eye itching, no abdominal pain, no diarrhea, no nausea, no vomiting, no congestion, no headaches, no sore throat, no neck pain, no cough, no rash and no eye discharge. Recently DX with Covid. Developed painful sore in back of mouth yesterday. Tylenol PRN. Gargling peroxide PRN     Past Medical History:   Diagnosis Date    Bronchitis     Colitis 2019    DIFFUSE COLITIS FROM 15 CM IN THE RECTUM TO 45 CM IN THE SIGMOID    Intermittent epigastric abdominal pain     Rectal bleeding       Past Surgical History:   Procedure Laterality Date    COLONOSCOPY N/A 2019    DIFFUSE COLITIS FROM 15 CM IN THE RECTUM TO 45 CM IN THE SIGMOID    WISDOM TOOTH EXTRACTION       History reviewed. No pertinent family history.     Social History     Tobacco Use    Smoking status: Former Smoker     Quit date: 12/3/2014     Years since quittin.0    Smokeless tobacco: Never Used   Substance Use Topics    Alcohol use: Yes     Comment: daily      Current Outpatient Medications   Medication Sig Dispense Refill    azithromycin (ZITHROMAX) 250 MG tablet Take 2 tabs (500 mg) on Day 1, and take 1 tab (250 mg) on days 2 through 5. 1 packet 0    predniSONE (DELTASONE) 20 MG tablet Take 2 tablets by mouth daily for 5 days 10 tablet 0    Magic Mouthwash (MIRACLE MOUTHWASH) Swish and spit 10 mLs as needed (NO MORE THAN 6 X PER DAY) 120 mL 0    fluticasone (FLONASE) 50 MCG/ACT nasal spray 2 sprays by Nasal route daily 16 g 0     No current facility-administered medications for this visit. No Known Allergies    Subjective:      Review of Systems   Constitutional: Negative for appetite change, chills, fatigue and fever. HENT: Positive for mouth sores. Negative for congestion, postnasal drip and sore throat. Eyes: Negative. Negative for discharge and itching. Respiratory: Negative for cough, chest tightness and shortness of breath. Cardiovascular: Negative for chest pain, palpitations and leg swelling. Gastrointestinal: Negative for abdominal pain, diarrhea, nausea and vomiting. Endocrine: Negative. Genitourinary: Negative for dysuria, frequency and urgency. Musculoskeletal: Negative for neck pain and neck stiffness. Skin: Negative for rash. Allergic/Immunologic: Negative. Neurological: Negative for dizziness, weakness, light-headedness and headaches. Hematological: Negative for adenopathy. Psychiatric/Behavioral: Negative for confusion. Objective:      Physical Exam  Vitals reviewed. Constitutional:       Appearance: He is well-developed. HENT:      Head: Normocephalic. Right Ear: External ear normal. A middle ear effusion is present. Left Ear: External ear normal. A middle ear effusion is present. Tympanic membrane is erythematous and bulging. Nose: Nose normal.      Mouth/Throat:      Mouth: Oral lesions present. Comments: Oral lesions noted to back of mouth- similar appearance to aphthous ulcers   Eyes:      Conjunctiva/sclera: Conjunctivae normal.      Pupils: Pupils are equal, round, and reactive to light. Cardiovascular:      Rate and Rhythm: Normal rate and regular rhythm. Heart sounds: Normal heart sounds. No murmur heard. No friction rub. No gallop.     Pulmonary:      Effort: Pulmonary effort is normal. No respiratory distress. Breath sounds: Normal breath sounds. Abdominal:      General: Bowel sounds are normal.      Palpations: Abdomen is soft. Musculoskeletal:         General: Normal range of motion. Cervical back: Normal range of motion and neck supple. Lymphadenopathy:      Cervical: No cervical adenopathy. Skin:     General: Skin is warm and dry. Findings: No rash. Neurological:      Mental Status: He is alert and oriented to person, place, and time. Cranial Nerves: No cranial nerve deficit. Coordination: Coordination normal.      Deep Tendon Reflexes: Reflexes are normal and symmetric. Psychiatric:         Thought Content: Thought content normal.       /76 (Site: Left Upper Arm, Position: Sitting, Cuff Size: Medium Adult)   Pulse 67   Temp 97.3 °F (36.3 °C) (Infrared)   SpO2 98%     Assessment:       Diagnosis Orders   1. Oral lesion  predniSONE (DELTASONE) 20 MG tablet    Magic Mouthwash (MIRACLE MOUTHWASH)   2. COVID     3. Acute suppurative otitis media of left ear without spontaneous rupture of tympanic membrane, recurrence not specified  azithromycin (ZITHROMAX) 250 MG tablet    predniSONE (DELTASONE) 20 MG tablet           Plan:     1.) Start Cherylyn Bailee for ear infection   2.) Start Prednisone today  3.) Magic mouthwash PRN   4.) Remain quarantined for Covid as recommended by CDC guidelines   5.) RTO PRN   6.) Follow-up with PCP     Problem List     None         Patient given educationalmaterials - see patient instructions. Discussed use, benefit, and side effectsof prescribed medications. All patient questions answered. Pt verbalized understanding. Instructed to continue current medications, diet and exercise. Patient agreedwith treatment plan. Follow up as directed.      Electronically signed by LENCHO Aguirre CNP on 12/27/2021 at 7:01 PM

## 2021-12-23 NOTE — PATIENT INSTRUCTIONS
Patient Education        prednisone  Pronunciation:  PRED ni karan  Brand:  Meri  What is the most important information I should know about prednisone? You should not use prednisone if you have a fungal infection anywhere in your body. You should not stop using prednisone suddenly. Follow your doctor's instructions about tapering your dose. What is prednisone? Prednisone is a steroid that reduces inflammation in the body, and also suppresses your immune system. Prednisone is used to treat many different conditions such as hormonal disorders, skin diseases, arthritis, lupus, psoriasis, allergic conditions, ulcerative colitis, Crohn's disease, eye diseases, lung diseases, asthma, tuberculosis, blood cell disorders, kidney disorders, leukemia, lymphoma, multiple sclerosis, organ transplant rejection, swelling from a brain tumor or injury. Prednisone may also be used for purposes not listed in this medication guide. What should I discuss with my healthcare provider before taking prednisone? You should not use prednisone if you are allergic to it, or if you have a fungal infection anywhere in your body. Steroid medication can weaken your immune system, making it easier for you to get an infection or worsening an infection you already have. Tell your doctor about any illness or infection you've had within the past several weeks.   Tell your doctor if you have ever had:  · heart problems, high blood pressure, or a heart attack;  · glaucoma or cataracts;  · herpes infection of the eyes;  · past or present tuberculosis;  · a parasite infection that causes diarrhea (such as threadworms);  · any illness that causes diarrhea;  · underactive thyroid;  · diabetes;  · a stomach ulcer, diverticulitis;  · a colostomy or ileostomy;  · osteoporosis or low bone mineral density (steroid medication can increase your risk of bone loss);  · low levels of calcium or potassium in your blood;  · cirrhosis or other liver disease;  · mental illness or psychosis; or  · a muscle disorder such as myasthenia gravis. Long-term use of steroids may lead to bone loss (osteoporosis), especially if you smoke or drink alcohol, if you do not exercise, or if you do not get enough vitamin D or calcium in your diet. It is not known whether this medicine will harm an unborn baby. Tell your doctor if you are pregnant or plan to become pregnant. You should not breastfeed while using prednisone. How should I take prednisone? Follow all directions on your prescription label and read all medication guides or instruction sheets. Your doctor may occasionally change your dose. Use the medicine exactly as directed. Prednisone is taken daily or every other day, depending on the condition being treated. You may need to take the medicine at a certain time of day. Follow your doctor's instructions about when and how often to take this medicine. Take with food if prednisone upsets your stomach. Measure liquid medicine carefully. Use the dosing syringe provided, or use a medicine dose-measuring device (not a kitchen spoon). Swallow the delayed-release tablet whole and do not crush, chew, or break it. Prednisone can weaken (suppress) your immune system, and you may get an infection more easily. Call your doctor if you have signs of infection (fever, weakness, cold or flu symptoms, skin sores, diarrhea, frequent or recurring illness). If you have major surgery or a severe injury or infection, your prednisone dose needs may change. Make sure any doctor caring for you knows you are using this medicine. If you use this medicine long-term, you may need medical tests and vision exams. In case of emergency, wear or carry medical identification to let others know you use a steroid. You should not stop using prednisone suddenly. Follow your doctor's instructions about tapering your dose. Store at room temperature away from moisture, heat, and light.   What happens if I miss a dose? Take the medicine as soon as you can, but skip the missed dose if it is almost time for your next dose. Do not take two doses at one time. What happens if I overdose? Seek emergency medical attention or call the Poison Help line at 1-851.670.1537. High doses or long-term use of prednisone can lead to thinning skin, easy bruising, changes in body fat (especially in your face, neck, back, and waist), increased acne or facial hair, menstrual problems, impotence, or loss of interest in sex. What should I avoid while taking prednisone? Do not receive a \"live\" vaccine while using prednisone. The vaccine may not work as well and may not fully protect you from disease. Live vaccines include measles, mumps, rubella (MMR), polio, rotavirus, typhoid, yellow fever, varicella (chickenpox), zoster (shingles), and nasal flu (influenza) vaccine. Avoid being near people who are sick or have infections. Call your doctor for preventive treatment if you are exposed to chickenpox or measles. These conditions can be serious or even fatal in people who are using steroid medicine. Avoid drinking alcohol. What are the possible side effects of prednisone? Get emergency medical help if you have signs of an allergic reaction: hives; difficult breathing; swelling of your face, lips, tongue, or throat.   Call your doctor at once if you have:  · muscle pain or weakness;  · blurred vision, tunnel vision, eye pain, or seeing halos around lights;  · severe depression, changes in personality, unusual thoughts or behavior;  · bloody or tarry stools, coughing up blood or vomit that looks like coffee grounds;  · swelling, rapid weight gain, feeling short of breath;  · irregular heartbeats;  · severe headache, pounding in your neck or ears;  · decreased adrenal gland hormones --muscle weakness, tiredness, diarrhea, nausea, menstrual changes, skin discoloration, craving salty foods, and feeling light-headed; or  · low potassium level --leg cramps, constipation, irregular heartbeats, fluttering in your chest, increased thirst or urination, numbness or tingling, muscle weakness or limp feeling. Prednisone can affect growth in children. Tell your doctor if your child is not growing at a normal rate while using this medicine. Common side effects may include:  · weight gain (especially in your face or your upper back and torso);  · increased appetite;  · mood changes, trouble sleeping;  · changes in your menstrual periods;  · problems with memory or thought;  · muscle or joint pain;  · weakness;  · headache, dizziness, spinning sensation;  · nausea, bloating, loss of appetite;  · slow wound healing; or  · acne, increased sweating, thinning skin, bruising, pinpoint spots under your skin. This is not a complete list of side effects and others may occur. Call your doctor for medical advice about side effects. You may report side effects to FDA at 1-341-FDA-3235. What other drugs will affect prednisone? Sometimes it is not safe to use certain medications at the same time. Some drugs can affect your blood levels of other drugs you take, which may increase side effects or make the medications less effective. Tell your doctor about all your current medicines. Many drugs can affect prednisone, especially:  · bupropion;  · cyclosporine;  · digoxin;  · ketoconazole;  · an antibiotic;  · birth control pills or hormone replacement therapy;  · a diuretic or \"water pill\";  · insulin or oral diabetes medicine;  · a blood thinner --warfarin, Coumadin, Jantoven; or  · NSAIDs (nonsteroidal anti-inflammatory drugs) --aspirin, ibuprofen (Advil, Motrin), naproxen (Aleve), celecoxib, diclofenac, indomethacin, meloxicam, and others. This list is not complete and many other drugs may affect prednisone. This includes prescription and over-the-counter medicines, vitamins, and herbal products. Not all possible drug interactions are listed here.   Where can I get more information? Your pharmacist can provide more information about prednisone. Remember, keep this and all other medicines out of the reach of children, never share your medicines with others, and use this medication only for the indication prescribed. Every effort has been made to ensure that the information provided by [address] is accurate, up-to-date, and complete, but no guarantee is made to that effect. Drug information contained herein may be time sensitive. [facility] information has been compiled for use by healthcare practitioners and consumers in the [place] and therefore [facility] does not warrant that uses outside of the [place] are appropriate, unless specifically indicated otherwise. [facility]'s drug information does not endorse drugs, diagnose patients or recommend therapy. [facility]'s drug information is an informational resource designed to assist licensed healthcare practitioners in caring for their patients and/or to serve consumers viewing this service as a supplement to, and not a substitute for, the expertise, skill, knowledge and judgment of healthcare practitioners. The absence of a warning for a given drug or drug combination in no way should be construed to indicate that the drug or drug combination is safe, effective or appropriate for any given patient. [facility] does not assume any responsibility for any aspect of healthcare administered with the aid of information [facility] provides. The information contained herein is not intended to cover all possible uses, directions, precautions, warnings, drug interactions, allergic reactions, or adverse effects. If you have questions about the drugs you are taking, check with your doctor, nurse or pharmacist.  Copyright 2941-7273 [address]. Version: 10.01. Revision date: 3/28/2019. Care instructions adapted under license by [facility] ([facility]).  If you have questions about a medical condition or this instruction,

## 2022-10-04 ENCOUNTER — HOSPITAL ENCOUNTER (OUTPATIENT)
Age: 37
Setting detail: SPECIMEN
Discharge: HOME | End: 2022-10-04

## 2022-10-04 ENCOUNTER — OFFICE VISIT (OUTPATIENT)
Dept: FAMILY MEDICINE CLINIC | Age: 37
End: 2022-10-04
Payer: COMMERCIAL

## 2022-10-04 VITALS
SYSTOLIC BLOOD PRESSURE: 113 MMHG | DIASTOLIC BLOOD PRESSURE: 69 MMHG | HEIGHT: 71 IN | WEIGHT: 165 LBS | OXYGEN SATURATION: 100 % | HEART RATE: 61 BPM | TEMPERATURE: 96.8 F | BODY MASS INDEX: 23.1 KG/M2

## 2022-10-04 DIAGNOSIS — J01.90 ACUTE BACTERIAL SINUSITIS: Primary | ICD-10-CM

## 2022-10-04 DIAGNOSIS — J02.9 SORE THROAT: ICD-10-CM

## 2022-10-04 DIAGNOSIS — B96.89 ACUTE BACTERIAL SINUSITIS: Primary | ICD-10-CM

## 2022-10-04 PROCEDURE — 99213 OFFICE O/P EST LOW 20 MIN: CPT

## 2022-10-04 RX ORDER — AZITHROMYCIN 250 MG/1
250 TABLET, FILM COATED ORAL SEE ADMIN INSTRUCTIONS
Qty: 6 TABLET | Refills: 0 | Status: SHIPPED | OUTPATIENT
Start: 2022-10-04 | End: 2022-10-09

## 2022-10-04 SDOH — ECONOMIC STABILITY: FOOD INSECURITY: WITHIN THE PAST 12 MONTHS, THE FOOD YOU BOUGHT JUST DIDN'T LAST AND YOU DIDN'T HAVE MONEY TO GET MORE.: NEVER TRUE

## 2022-10-04 SDOH — ECONOMIC STABILITY: FOOD INSECURITY: WITHIN THE PAST 12 MONTHS, YOU WORRIED THAT YOUR FOOD WOULD RUN OUT BEFORE YOU GOT MONEY TO BUY MORE.: NEVER TRUE

## 2022-10-04 ASSESSMENT — ENCOUNTER SYMPTOMS
RHINORRHEA: 0
VOICE CHANGE: 0
SINUS PAIN: 0
SORE THROAT: 1
EYE ITCHING: 0
VOMITING: 0
NAUSEA: 0
GASTROINTESTINAL NEGATIVE: 1
SINUS PRESSURE: 0
APNEA: 0
PHOTOPHOBIA: 0
STRIDOR: 0
CONSTIPATION: 0
WHEEZING: 0
SHORTNESS OF BREATH: 0
TROUBLE SWALLOWING: 0
BACK PAIN: 0
VISUAL CHANGE: 0
FACIAL SWELLING: 0
EYES NEGATIVE: 1
DIARRHEA: 0
BLOOD IN STOOL: 0
EYE PAIN: 0
COUGH: 0
RESPIRATORY NEGATIVE: 1
SWOLLEN GLANDS: 0
CHOKING: 0
COLOR CHANGE: 0
ANAL BLEEDING: 0
RECTAL PAIN: 0
EYE REDNESS: 0
EYE DISCHARGE: 0
ABDOMINAL PAIN: 0
ABDOMINAL DISTENTION: 0
CHANGE IN BOWEL HABIT: 0
CHEST TIGHTNESS: 0

## 2022-10-04 ASSESSMENT — PATIENT HEALTH QUESTIONNAIRE - PHQ9
SUM OF ALL RESPONSES TO PHQ QUESTIONS 1-9: 0
2. FEELING DOWN, DEPRESSED OR HOPELESS: 0
SUM OF ALL RESPONSES TO PHQ QUESTIONS 1-9: 0
1. LITTLE INTEREST OR PLEASURE IN DOING THINGS: 0
SUM OF ALL RESPONSES TO PHQ9 QUESTIONS 1 & 2: 0

## 2022-10-04 ASSESSMENT — SOCIAL DETERMINANTS OF HEALTH (SDOH): HOW HARD IS IT FOR YOU TO PAY FOR THE VERY BASICS LIKE FOOD, HOUSING, MEDICAL CARE, AND HEATING?: NOT HARD AT ALL

## 2022-10-04 NOTE — PROGRESS NOTES
[facility]-IN FAMILY MEDIC[facility]  [address] [facility],[address]  [address] [place] [address]-IN FAMILY MEDICINE  [address] [address]  Dept: [phone] Jesus Alberto Cowart is a 43 y.o. male Established patient, who presents to the walk-in clinic today with conditions/complaints as noted below:    Chief Complaint   Patient presents with    Pharyngitis     Patient has been experiencing this for a month. Swollen glands. Hurts to swallow. HPI:     Pharyngitis  This is a new problem. Episode onset: 1 month ago. The problem occurs constantly. The problem has been gradually worsening. Associated symptoms include a sore throat. Pertinent negatives include no abdominal pain, anorexia, arthralgias, change in bowel habit, chest pain, chills, congestion, coughing, diaphoresis, fatigue, fever, headaches, joint swelling, myalgias, nausea, neck pain, numbness, rash, swollen glands, urinary symptoms, vertigo, visual change, vomiting or weakness. Treatments tried: Tylenol. The treatment provided no relief.      Past Medical History:   Diagnosis Date    Bronchitis     Colitis 11/05/2019    DIFFUSE COLITIS FROM 15 CM IN THE RECTUM TO 45 CM IN THE SIGMOID    Intermittent epigastric abdominal pain     Rectal bleeding        Current Outpatient Medications   Medication Sig Dispense Refill    azithromycin (ZITHROMAX) 250 MG tablet Take 1 tablet by mouth See Admin Instructions for 5 days 500mg on day 1 followed by 250mg on days 2 - 5 6 tablet 0    Magic Mouthwash (MIRACLE MOUTHWASH) Swish and spit 10 mLs as needed (NO MORE THAN 6 X PER DAY) (Patient not taking: Reported on 10/4/2022) 120 mL 0    fluticasone (FLONASE) 50 MCG/ACT nasal spray 2 sprays by Nasal route daily (Patient not taking: Reported on 10/4/2022) 16 g 0     No current facility-administered medications for this visit. No Known Allergies    Review of Systems:     Review of Systems   Constitutional: Negative. Negative for activity change, appetite change, chills, diaphoresis, fatigue, fever and unexpected weight change. HENT:  Positive for sore throat. Negative for congestion, dental problem, drooling, ear discharge, ear pain, facial swelling, hearing loss, mouth sores, nosebleeds, postnasal drip, rhinorrhea, sinus pressure, sinus pain, sneezing, tinnitus, trouble swallowing and voice change. Eyes: Negative. Negative for photophobia, pain, discharge, redness, itching and visual disturbance. Respiratory: Negative. Negative for apnea, cough, choking, chest tightness, shortness of breath, wheezing and stridor. Cardiovascular: Negative. Negative for chest pain, palpitations and leg swelling. Gastrointestinal: Negative. Negative for abdominal distention, abdominal pain, anal bleeding, anorexia, blood in stool, change in bowel habit, constipation, diarrhea, nausea, rectal pain and vomiting. Musculoskeletal: Negative. Negative for arthralgias, back pain, gait problem, joint swelling, myalgias, neck pain and neck stiffness. Skin: Negative. Negative for color change, pallor, rash and wound. Neurological: Negative. Negative for dizziness, vertigo, tremors, seizures, syncope, facial asymmetry, speech difficulty, weakness, light-headedness, numbness and headaches. Hematological:  Positive for adenopathy. Does not bruise/bleed easily. Physical Exam:      /69   Pulse 61   Temp 96.8 °F (36 °C) (Temporal)   Ht 5' 11\" (1.803 m)   Wt 165 lb (74.8 kg)   SpO2 100%   BMI 23.01 kg/m²     Physical Exam  Vitals reviewed. Constitutional:       Appearance: Normal appearance. He is normal weight. HENT:      Head: Normocephalic and atraumatic.       Right Ear: Tympanic membrane, ear canal and external ear normal.      Left Ear: Tympanic membrane, ear canal and external ear normal.      Nose: Nose

## 2022-10-05 LAB
DIRECT EXAM: NORMAL
SPECIMEN DESCRIPTION: NORMAL

## 2025-05-28 ENCOUNTER — HOSPITAL ENCOUNTER (OUTPATIENT)
Age: 40
Setting detail: SPECIMEN
Discharge: HOME | End: 2025-05-28

## 2025-05-28 ENCOUNTER — OFFICE VISIT (OUTPATIENT)
Dept: FAMILY MEDICINE CLINIC | Age: 40
End: 2025-05-28
Payer: COMMERCIAL

## 2025-05-28 VITALS
OXYGEN SATURATION: 99 % | DIASTOLIC BLOOD PRESSURE: 74 MMHG | SYSTOLIC BLOOD PRESSURE: 114 MMHG | HEART RATE: 54 BPM | TEMPERATURE: 97.3 F

## 2025-05-28 DIAGNOSIS — J02.9 SORE THROAT: ICD-10-CM

## 2025-05-28 DIAGNOSIS — J06.9 VIRAL URI: Primary | ICD-10-CM

## 2025-05-28 LAB — S PYO AG THROAT QL: NORMAL

## 2025-05-28 PROCEDURE — 99213 OFFICE O/P EST LOW 20 MIN: CPT | Performed by: NURSE PRACTITIONER

## 2025-05-28 PROCEDURE — 87880 STREP A ASSAY W/OPTIC: CPT | Performed by: NURSE PRACTITIONER

## 2025-05-28 ASSESSMENT — ENCOUNTER SYMPTOMS
RHINORRHEA: 1
CHOKING: 0
SORE THROAT: 1
CHEST TIGHTNESS: 0
SHORTNESS OF BREATH: 0
WHEEZING: 0
COUGH: 1
STRIDOR: 0

## 2025-05-28 NOTE — PROGRESS NOTES
[facility] WALK-IN FAMILY MEDICINE  [address]  [address]  [facility] [address]  Dept: [phone]  Dept Fax: [phone]    Gokul Nolasco is a 39 y.o. male who presents to the urgent care today for his medical conditions/complaints as notedbelow.  Gokul Nolasco is c/o of Sinus Problem (Onset since yesterday with PND, sore throat, cough,and  runny nose. Taking honey to soothe his throat. )      HPI:     39-year-old male presents for sore throat, runny nose and cough  Started yesterday  Declines flu and COVID testing.  Would like to be checked for strep throat.  No known strep throat exposure.  Kids sick with same as well    Cold Symptoms   This is a new problem. The current episode started yesterday. The problem has been unchanged. There has been no fever. Associated symptoms include coughing, a plugged ear sensation, rhinorrhea and a sore throat. Pertinent negatives include no congestion, ear pain or wheezing. Associated symptoms comments: pnd. He has tried increased fluids (honey) for the symptoms. The treatment provided no relief.       Past Medical History:   Diagnosis Date    Bronchitis     Colitis 11/05/2019    DIFFUSE COLITIS FROM 15 CM IN THE RECTUM TO 45 CM IN THE SIGMOID    Intermittent epigastric abdominal pain     Rectal bleeding         Current Outpatient Medications   Medication Sig Dispense Refill    Magic Mouthwash (MIRACLE MOUTHWASH) Swish and spit 10 mLs as needed (NO MORE THAN 6 X PER DAY) (Patient not taking: Reported on 5/28/2025) 120 mL 0    fluticasone (FLONASE) 50 MCG/ACT nasal spray 2 sprays by Nasal route daily (Patient not taking: Reported on 10/4/2022) 16 g 0     No current facility-administered medications for this visit.     No Known Allergies    Subjective:      Review of Systems   Constitutional: Negative.    HENT:  Positive for rhinorrhea and sore throat. Negative for congestion and ear pain.    Respiratory:

## 2025-05-29 ENCOUNTER — RESULTS FOLLOW-UP (OUTPATIENT)
Dept: FAMILY MEDICINE CLINIC | Age: 40
End: 2025-05-29

## 2025-05-29 LAB
MICROORGANISM/AGENT SPEC: NORMAL
SPECIMEN DESCRIPTION: NORMAL

## (undated) DEVICE — FORCEPS BX L240CM JAW DIA2.8MM L CAP W/ NDL MIC MESH TOOTH

## (undated) DEVICE — GLOVE ORANGE PI 8   MSG9080

## (undated) DEVICE — JELLY,LUBE,STERILE,FLIP TOP,TUBE,2-OZ: Brand: MEDLINE

## (undated) DEVICE — GOWN,POLY REINFORCED,LG: Brand: MEDLINE

## (undated) DEVICE — DEFENDO AIR WATER SUCTION AND BIOPSY VALVE KIT FOR  OLYMPUS: Brand: DEFENDO AIR/WATER/SUCTION AND BIOPSY VALVE